# Patient Record
Sex: MALE | Race: WHITE
[De-identification: names, ages, dates, MRNs, and addresses within clinical notes are randomized per-mention and may not be internally consistent; named-entity substitution may affect disease eponyms.]

---

## 2022-12-04 ENCOUNTER — HOSPITAL ENCOUNTER (EMERGENCY)
Dept: HOSPITAL 54 - ER | Age: 22
Discharge: HOME | End: 2022-12-04
Payer: SELF-PAY

## 2022-12-04 VITALS — DIASTOLIC BLOOD PRESSURE: 65 MMHG | SYSTOLIC BLOOD PRESSURE: 115 MMHG

## 2022-12-04 VITALS — BODY MASS INDEX: 24.16 KG/M2 | HEIGHT: 65 IN | WEIGHT: 145 LBS

## 2022-12-04 DIAGNOSIS — F10.129: Primary | ICD-10-CM
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
## 2022-12-04 DIAGNOSIS — Z60.2: ICD-10-CM

## 2022-12-04 PROCEDURE — 99285 EMERGENCY DEPT VISIT HI MDM: CPT

## 2022-12-04 PROCEDURE — 96372 THER/PROPH/DIAG INJ SC/IM: CPT

## 2022-12-04 PROCEDURE — 82962 GLUCOSE BLOOD TEST: CPT

## 2022-12-04 SDOH — SOCIAL STABILITY - SOCIAL INSECURITY: PROBLEMS RELATED TO LIVING ALONE: Z60.2

## 2022-12-04 NOTE — NUR
TO ER BED 4. [id number] FOR ETOH. RR EVEN AND NON LABORED. 2 EPISODES 
OF EMESIS UPON ARRIVAL. SAFETY PRECAUTIONS IN PLACE. CONNECTED TO POX AND HEART 
MONITOR. AWAITING MD CHAVARRIA

## 2022-12-04 NOTE — NUR
PT IS AWAKE. VERBALLY RESPONSIVE. AMBULATORY W. STEADY GAIT. DR DUTTA IN TO 
SEE PATIENT. MEDICALLY CLEARED. D/C HOME INI STABLE CONDITION.